# Patient Record
Sex: FEMALE | Race: WHITE | HISPANIC OR LATINO | URBAN - METROPOLITAN AREA
[De-identification: names, ages, dates, MRNs, and addresses within clinical notes are randomized per-mention and may not be internally consistent; named-entity substitution may affect disease eponyms.]

---

## 2017-01-17 ENCOUNTER — FOLLOW UP ESTABLISHED (OUTPATIENT)
Dept: URBAN - METROPOLITAN AREA CLINIC 64 | Facility: CLINIC | Age: 22
End: 2017-01-17
Payer: COMMERCIAL

## 2017-01-17 PROCEDURE — 92014 COMPRE OPH EXAM EST PT 1/>: CPT | Performed by: OPTOMETRIST

## 2017-01-17 PROCEDURE — 92310 CONTACT LENS FITTING OU: CPT | Performed by: OPTOMETRIST

## 2017-01-17 PROCEDURE — 92015 DETERMINE REFRACTIVE STATE: CPT | Performed by: OPTOMETRIST

## 2017-01-17 ASSESSMENT — VISUAL ACUITY
OD: 20/20
OS: 20/20

## 2017-01-17 ASSESSMENT — KERATOMETRY
OD: 43.72
OS: 43.87

## 2017-01-17 ASSESSMENT — INTRAOCULAR PRESSURE
OD: 12
OS: 12

## 2018-05-10 ENCOUNTER — FOLLOW UP ESTABLISHED (OUTPATIENT)
Dept: URBAN - METROPOLITAN AREA CLINIC 64 | Facility: CLINIC | Age: 23
End: 2018-05-10
Payer: COMMERCIAL

## 2018-05-10 PROCEDURE — 92015 DETERMINE REFRACTIVE STATE: CPT | Performed by: OPTOMETRIST

## 2018-05-10 PROCEDURE — 92014 COMPRE OPH EXAM EST PT 1/>: CPT | Performed by: OPTOMETRIST

## 2018-05-10 ASSESSMENT — VISUAL ACUITY
OS: 20/20
OD: 20/20

## 2018-05-10 ASSESSMENT — INTRAOCULAR PRESSURE
OS: 15
OD: 13

## 2019-07-10 ENCOUNTER — FOLLOW UP ESTABLISHED (OUTPATIENT)
Dept: URBAN - METROPOLITAN AREA CLINIC 64 | Facility: CLINIC | Age: 24
End: 2019-07-10
Payer: COMMERCIAL

## 2019-07-10 PROCEDURE — 92310 CONTACT LENS FITTING OU: CPT | Performed by: OPTOMETRIST

## 2019-07-10 PROCEDURE — 92014 COMPRE OPH EXAM EST PT 1/>: CPT | Performed by: OPTOMETRIST

## 2019-07-10 PROCEDURE — 92015 DETERMINE REFRACTIVE STATE: CPT | Performed by: OPTOMETRIST

## 2019-07-10 ASSESSMENT — KERATOMETRY
OS: 44.09
OD: 43.63

## 2019-07-10 ASSESSMENT — INTRAOCULAR PRESSURE
OD: 17
OS: 12

## 2019-07-10 ASSESSMENT — VISUAL ACUITY
OS: 20/20
OD: 20/20

## 2020-10-15 ENCOUNTER — FOLLOW UP ESTABLISHED (OUTPATIENT)
Dept: URBAN - METROPOLITAN AREA CLINIC 64 | Facility: CLINIC | Age: 25
End: 2020-10-15
Payer: COMMERCIAL

## 2020-10-15 PROCEDURE — 92015 DETERMINE REFRACTIVE STATE: CPT | Performed by: OPTOMETRIST

## 2020-10-15 PROCEDURE — 92014 COMPRE OPH EXAM EST PT 1/>: CPT | Performed by: OPTOMETRIST

## 2020-10-15 PROCEDURE — 92310 CONTACT LENS FITTING OU: CPT | Performed by: OPTOMETRIST

## 2020-10-15 ASSESSMENT — INTRAOCULAR PRESSURE
OS: 9
OD: 9

## 2020-10-15 ASSESSMENT — VISUAL ACUITY
OS: 20/20
OD: 20/20

## 2020-10-15 ASSESSMENT — KERATOMETRY
OS: 44.09
OD: 43.84

## 2021-11-05 ENCOUNTER — OFFICE VISIT (OUTPATIENT)
Dept: URBAN - METROPOLITAN AREA CLINIC 64 | Facility: CLINIC | Age: 26
End: 2021-11-05
Payer: COMMERCIAL

## 2021-11-05 DIAGNOSIS — H52.13 MYOPIA, BILATERAL: Primary | ICD-10-CM

## 2021-11-05 PROCEDURE — 92014 COMPRE OPH EXAM EST PT 1/>: CPT | Performed by: OPTOMETRIST

## 2021-11-05 ASSESSMENT — VISUAL ACUITY
OD: 20/20
OS: 20/20

## 2021-11-05 ASSESSMENT — INTRAOCULAR PRESSURE
OD: 16
OS: 14

## 2021-11-05 ASSESSMENT — KERATOMETRY
OS: 44.35
OD: 44.06

## 2021-11-05 NOTE — IMPRESSION/PLAN
Impression: Myopia, bilateral Plan: Updated glasses Rx. No CLs desired due to dry eye. [facility] [address] [facility]

## 2021-12-03 ENCOUNTER — OFFICE VISIT (OUTPATIENT)
Dept: URBAN - METROPOLITAN AREA CLINIC 64 | Facility: CLINIC | Age: 26
End: 2021-12-03

## 2021-12-03 PROCEDURE — 92310 CONTACT LENS FITTING OU: CPT | Performed by: OPTOMETRIST

## 2021-12-03 ASSESSMENT — KERATOMETRY
OS: 44.23
OD: 44.09

## 2021-12-22 ENCOUNTER — REFRACTIVE (OUTPATIENT)
Dept: URBAN - METROPOLITAN AREA CLINIC 64 | Facility: CLINIC | Age: 26
End: 2021-12-22

## 2021-12-22 ASSESSMENT — VISUAL ACUITY
OD: 20/20
OS: 20/20

## 2021-12-22 ASSESSMENT — INTRAOCULAR PRESSURE
OD: 15
OS: 16

## 2021-12-22 ASSESSMENT — KERATOMETRY
OD: 43.89
OS: 44.23

## 2021-12-23 ENCOUNTER — POST-OPERATIVE VISIT (OUTPATIENT)
Dept: URBAN - METROPOLITAN AREA CLINIC 64 | Facility: CLINIC | Age: 26
End: 2021-12-23

## 2021-12-23 ENCOUNTER — REFRACTIVE (OUTPATIENT)
Dept: URBAN - METROPOLITAN AREA CLINIC 66 | Facility: CLINIC | Age: 26
End: 2021-12-23

## 2021-12-23 PROCEDURE — 99024 POSTOP FOLLOW-UP VISIT: CPT | Performed by: STUDENT IN AN ORGANIZED HEALTH CARE EDUCATION/TRAINING PROGRAM

## 2021-12-23 PROCEDURE — LASIK LASIK PRKSURGEON'S FEE: CUSTOM | Performed by: OPHTHALMOLOGY

## 2021-12-23 PROCEDURE — LASIK LASIK SURGEON'S FEE: CUSTOM | Performed by: OPHTHALMOLOGY

## 2021-12-23 NOTE — IMPRESSION/PLAN
Impression: S/P LASIK - Standard OU - . Encounter for surgical aftercare following surgery on a sense organ  Z48.810. Plan: Continue with drops as prescribed. RTC in 1 week.

## 2021-12-30 ENCOUNTER — POST-OPERATIVE VISIT (OUTPATIENT)
Dept: URBAN - METROPOLITAN AREA CLINIC 64 | Facility: CLINIC | Age: 26
End: 2021-12-30

## 2021-12-30 PROCEDURE — 99024 POSTOP FOLLOW-UP VISIT: CPT | Performed by: OPTOMETRIST

## 2021-12-30 ASSESSMENT — VISUAL ACUITY
OD: 20/20
OS: 20/20

## 2021-12-30 NOTE — IMPRESSION/PLAN
Impression: S/P LASIK OU - 7 Days. Encounter for surgical aftercare following surgery on a sense organ  Z48.810. Plan: S/P Lasik OU. Doing well. Mild myopia OS. Re-check in 3 months.

## 2022-03-25 ENCOUNTER — POST-OPERATIVE VISIT (OUTPATIENT)
Dept: URBAN - METROPOLITAN AREA CLINIC 64 | Facility: CLINIC | Age: 27
End: 2022-03-25

## 2022-03-25 DIAGNOSIS — Z48.810 ENCOUNTER FOR SURGICAL AFTERCARE FOLLOWING SURGERY ON A SENSE ORGAN: Primary | ICD-10-CM

## 2022-03-25 PROCEDURE — 99024 POSTOP FOLLOW-UP VISIT: CPT | Performed by: OPTOMETRIST

## 2022-03-25 ASSESSMENT — VISUAL ACUITY
OS: 20/20
OD: 20/20

## 2022-03-25 NOTE — IMPRESSION/PLAN
Impression: S/P LASIK OU - 92 Days. Encounter for surgical aftercare following surgery on a sense organ  Z48.810. Plan: S/P Lasik OU - doing well. VFL patient.   RTC in Dec.

## 2022-12-30 ENCOUNTER — OFFICE VISIT (OUTPATIENT)
Dept: URBAN - METROPOLITAN AREA CLINIC 64 | Facility: CLINIC | Age: 27
End: 2022-12-30
Payer: COMMERCIAL

## 2022-12-30 DIAGNOSIS — H52.13 MYOPIA, BILATERAL: Primary | ICD-10-CM

## 2022-12-30 PROCEDURE — 92014 COMPRE OPH EXAM EST PT 1/>: CPT | Performed by: OPTOMETRIST

## 2022-12-30 ASSESSMENT — VISUAL ACUITY
OS: 20/20
OD: 20/20

## 2022-12-30 NOTE — IMPRESSION/PLAN
Impression: Myopia, bilateral: H52.13. Plan: S/P LASIK 2021 OU. Doing well. No treatment needed at this time. Recommend yearly exams.

## 2024-01-12 ENCOUNTER — OFFICE VISIT (OUTPATIENT)
Dept: URBAN - METROPOLITAN AREA CLINIC 64 | Facility: LOCATION | Age: 29
End: 2024-01-12
Payer: COMMERCIAL

## 2024-01-12 DIAGNOSIS — H52.13 MYOPIA, BILATERAL: Primary | ICD-10-CM

## 2024-01-12 PROCEDURE — 92014 COMPRE OPH EXAM EST PT 1/>: CPT | Performed by: OPTOMETRIST

## 2024-01-12 ASSESSMENT — KERATOMETRY
OS: 40.74
OD: 40.04

## 2024-01-12 ASSESSMENT — VISUAL ACUITY
OS: 20/20
OD: 20/20

## 2024-01-12 ASSESSMENT — INTRAOCULAR PRESSURE
OD: 14
OS: 17

## 2025-01-10 ENCOUNTER — OFFICE VISIT (OUTPATIENT)
Dept: URBAN - METROPOLITAN AREA CLINIC 64 | Facility: LOCATION | Age: 30
End: 2025-01-10
Payer: COMMERCIAL

## 2025-01-10 DIAGNOSIS — H52.13 MYOPIA, BILATERAL: Primary | ICD-10-CM

## 2025-01-10 PROCEDURE — 92014 COMPRE OPH EXAM EST PT 1/>: CPT

## 2025-01-10 ASSESSMENT — VISUAL ACUITY
OD: 20/20
OS: 20/20

## 2025-01-10 ASSESSMENT — KERATOMETRY
OS: 40.65
OD: 39.88

## 2025-01-10 ASSESSMENT — INTRAOCULAR PRESSURE
OS: 14
OD: 13